# Patient Record
Sex: FEMALE | URBAN - METROPOLITAN AREA
[De-identification: names, ages, dates, MRNs, and addresses within clinical notes are randomized per-mention and may not be internally consistent; named-entity substitution may affect disease eponyms.]

---

## 2022-07-15 ENCOUNTER — OFFICE VISIT (OUTPATIENT)
Dept: URBAN - METROPOLITAN AREA CLINIC 6 | Facility: CLINIC | Age: 52
End: 2022-07-15
Payer: COMMERCIAL

## 2022-07-15 DIAGNOSIS — H04.011 ACUTE DACRYOADENITIS, RIGHT LACRIMAL GLAND: Primary | ICD-10-CM

## 2022-07-15 PROCEDURE — 92002 INTRM OPH EXAM NEW PATIENT: CPT | Performed by: OPTOMETRIST

## 2022-07-15 RX ORDER — METHYLPREDNISOLONE 4 MG/1
4 MG TABLET ORAL
Qty: 21 | Refills: 0 | Status: ACTIVE
Start: 2022-07-15

## 2022-07-15 RX ORDER — ACYCLOVIR 400 MG/1
400 MG TABLET ORAL
Qty: 14 | Refills: 0 | Status: ACTIVE
Start: 2022-07-15

## 2022-07-15 RX ORDER — AMOXICILLIN AND CLAVULANATE POTASSIUM 875; 125 MG/1; MG/1
TABLET, FILM COATED ORAL
Qty: 14 | Refills: 0 | Status: ACTIVE
Start: 2022-07-15

## 2022-07-15 ASSESSMENT — INTRAOCULAR PRESSURE
OS: 22
OD: 22

## 2022-07-15 NOTE — IMPRESSION/PLAN
Impression: Acute dacryoadenitis, right lacrimal gland: H04.011. Plan: Emergency visit for Dacryoadenitis OD- Start Augmentin (generic) 875 BID for 7 days, Medrol Chuy to take as per package instructions, and Acyclovir 400mg BID for 7 days. Recommended to take probiotics suck as yogurt to protect stomach. Differentials include: bacterial, viral, idiopathic. Plan for blood work if needed at follow up vs consult for drainage.

## 2022-07-28 ENCOUNTER — OFFICE VISIT (OUTPATIENT)
Dept: URBAN - METROPOLITAN AREA CLINIC 6 | Facility: CLINIC | Age: 52
End: 2022-07-28
Payer: COMMERCIAL

## 2022-07-28 DIAGNOSIS — H04.011 ACUTE DACRYOADENITIS, RIGHT LACRIMAL GLAND: Primary | ICD-10-CM

## 2022-07-28 PROCEDURE — 92012 INTRM OPH EXAM EST PATIENT: CPT | Performed by: OPTOMETRIST

## 2022-07-28 ASSESSMENT — INTRAOCULAR PRESSURE
OD: 24
OS: 24

## 2022-07-28 NOTE — IMPRESSION/PLAN
Impression: Acute dacryoadenitis, right lacrimal gland: H04.011. Plan: FU visit for Dacryoadenitis OD- Pt finished Augmentin (generic) 875 BID for 7 days, Medrol Chuy to take as per package instructions, and Acyclovir 400mg BID for 7 days. Differentials included: bacterial, viral, idiopathic. Pain has resolved and pt notes improvement in size. Plan for schedule for consult for drainage with Dr Littie Alpers.